# Patient Record
Sex: FEMALE | Race: WHITE | NOT HISPANIC OR LATINO | Employment: STUDENT | URBAN - METROPOLITAN AREA
[De-identification: names, ages, dates, MRNs, and addresses within clinical notes are randomized per-mention and may not be internally consistent; named-entity substitution may affect disease eponyms.]

---

## 2023-11-21 ENCOUNTER — TELEPHONE (OUTPATIENT)
Dept: PRIMARY CARE CLINIC | Facility: CLINIC | Age: 23
End: 2023-11-21
Payer: COMMERCIAL

## 2023-11-21 NOTE — TELEPHONE ENCOUNTER
----- Message from Layne Vail sent at 11/21/2023  2:06 PM CST -----  Contact: [phone]  Patient's mother was not sure, but stated that daughter was a patient of Dr. Melchor.  ----- Message -----  From: Palmira Zepeda LPN  Sent: 11/21/2023  12:01 PM CST  To: Layne Vail    Is this Dr. Melchor patient?    ----- Message -----  From: Layne Vail  Sent: 11/21/2023  11:29 AM CST  To: [facility] Staff    Patient would like to schedule an appt with Nickie Suggs for an treatment of eating disorder. Please call [facility] patient's Mother @ [phone] and advise.    Thank you and have a great day.